# Patient Record
Sex: MALE | Race: WHITE | NOT HISPANIC OR LATINO | Employment: FULL TIME | URBAN - METROPOLITAN AREA
[De-identification: names, ages, dates, MRNs, and addresses within clinical notes are randomized per-mention and may not be internally consistent; named-entity substitution may affect disease eponyms.]

---

## 2024-03-20 ENCOUNTER — APPOINTMENT (OUTPATIENT)
Dept: CARDIOLOGY | Facility: HOSPITAL | Age: 34
End: 2024-03-20
Payer: COMMERCIAL

## 2024-03-20 ENCOUNTER — HOSPITAL ENCOUNTER (EMERGENCY)
Facility: HOSPITAL | Age: 34
Discharge: HOME | End: 2024-03-20
Attending: EMERGENCY MEDICINE
Payer: COMMERCIAL

## 2024-03-20 ENCOUNTER — APPOINTMENT (OUTPATIENT)
Dept: RADIOLOGY | Facility: HOSPITAL | Age: 34
End: 2024-03-20
Payer: COMMERCIAL

## 2024-03-20 VITALS
RESPIRATION RATE: 18 BRPM | HEART RATE: 77 BPM | HEIGHT: 72 IN | DIASTOLIC BLOOD PRESSURE: 89 MMHG | WEIGHT: 205 LBS | SYSTOLIC BLOOD PRESSURE: 113 MMHG | BODY MASS INDEX: 27.77 KG/M2 | OXYGEN SATURATION: 98 % | TEMPERATURE: 98.1 F

## 2024-03-20 DIAGNOSIS — R07.9 CHEST PAIN, UNSPECIFIED TYPE: Primary | ICD-10-CM

## 2024-03-20 LAB
ALBUMIN SERPL BCP-MCNC: 4.3 G/DL (ref 3.4–5)
ALP SERPL-CCNC: 49 U/L (ref 33–120)
ALT SERPL W P-5'-P-CCNC: 16 U/L (ref 10–52)
ANION GAP SERPL CALC-SCNC: 12 MMOL/L (ref 10–20)
AST SERPL W P-5'-P-CCNC: 11 U/L (ref 9–39)
ATRIAL RATE: 99 BPM
BASOPHILS # BLD AUTO: 0.04 X10*3/UL (ref 0–0.1)
BASOPHILS NFR BLD AUTO: 0.4 %
BILIRUB SERPL-MCNC: 0.5 MG/DL (ref 0–1.2)
BUN SERPL-MCNC: 14 MG/DL (ref 6–23)
CALCIUM SERPL-MCNC: 8.5 MG/DL (ref 8.6–10.3)
CARDIAC TROPONIN I PNL SERPL HS: <3 NG/L (ref 0–20)
CARDIAC TROPONIN I PNL SERPL HS: <3 NG/L (ref 0–20)
CHLORIDE SERPL-SCNC: 108 MMOL/L (ref 98–107)
CO2 SERPL-SCNC: 23 MMOL/L (ref 21–32)
CREAT SERPL-MCNC: 1.06 MG/DL (ref 0.5–1.3)
D DIMER PPP FEU-MCNC: <215 NG/ML FEU
EGFRCR SERPLBLD CKD-EPI 2021: >90 ML/MIN/1.73M*2
EOSINOPHIL # BLD AUTO: 0.28 X10*3/UL (ref 0–0.7)
EOSINOPHIL NFR BLD AUTO: 2.9 %
ERYTHROCYTE [DISTWIDTH] IN BLOOD BY AUTOMATED COUNT: 12.3 % (ref 11.5–14.5)
FLUAV RNA RESP QL NAA+PROBE: NOT DETECTED
FLUBV RNA RESP QL NAA+PROBE: NOT DETECTED
GLUCOSE SERPL-MCNC: 108 MG/DL (ref 74–99)
HCT VFR BLD AUTO: 42.7 % (ref 41–52)
HGB BLD-MCNC: 14.6 G/DL (ref 13.5–17.5)
IMM GRANULOCYTES # BLD AUTO: 0.03 X10*3/UL (ref 0–0.7)
IMM GRANULOCYTES NFR BLD AUTO: 0.3 % (ref 0–0.9)
INR PPP: 1.1 (ref 0.9–1.1)
LYMPHOCYTES # BLD AUTO: 3.16 X10*3/UL (ref 1.2–4.8)
LYMPHOCYTES NFR BLD AUTO: 33.1 %
MAGNESIUM SERPL-MCNC: 1.9 MG/DL (ref 1.6–2.4)
MCH RBC QN AUTO: 30.7 PG (ref 26–34)
MCHC RBC AUTO-ENTMCNC: 34.2 G/DL (ref 32–36)
MCV RBC AUTO: 90 FL (ref 80–100)
MONOCYTES # BLD AUTO: 0.72 X10*3/UL (ref 0.1–1)
MONOCYTES NFR BLD AUTO: 7.5 %
NEUTROPHILS # BLD AUTO: 5.33 X10*3/UL (ref 1.2–7.7)
NEUTROPHILS NFR BLD AUTO: 55.8 %
NRBC BLD-RTO: 0 /100 WBCS (ref 0–0)
P AXIS: 56 DEGREES
P OFFSET: 205 MS
P ONSET: 146 MS
PLATELET # BLD AUTO: 258 X10*3/UL (ref 150–450)
POTASSIUM SERPL-SCNC: 3.6 MMOL/L (ref 3.5–5.3)
PR INTERVAL: 148 MS
PROT SERPL-MCNC: 6.4 G/DL (ref 6.4–8.2)
PROTHROMBIN TIME: 11.9 SECONDS (ref 9.8–12.8)
Q ONSET: 220 MS
QRS COUNT: 16 BEATS
QRS DURATION: 90 MS
QT INTERVAL: 336 MS
QTC CALCULATION(BAZETT): 431 MS
QTC FREDERICIA: 397 MS
R AXIS: 28 DEGREES
RBC # BLD AUTO: 4.76 X10*6/UL (ref 4.5–5.9)
SARS-COV-2 RNA RESP QL NAA+PROBE: NOT DETECTED
SODIUM SERPL-SCNC: 139 MMOL/L (ref 136–145)
T AXIS: 40 DEGREES
T OFFSET: 388 MS
TSH SERPL-ACNC: 1.52 MIU/L (ref 0.44–3.98)
VENTRICULAR RATE: 99 BPM
WBC # BLD AUTO: 9.6 X10*3/UL (ref 4.4–11.3)

## 2024-03-20 PROCEDURE — 83735 ASSAY OF MAGNESIUM: CPT | Performed by: EMERGENCY MEDICINE

## 2024-03-20 PROCEDURE — 71045 X-RAY EXAM CHEST 1 VIEW: CPT

## 2024-03-20 PROCEDURE — 36415 COLL VENOUS BLD VENIPUNCTURE: CPT | Performed by: EMERGENCY MEDICINE

## 2024-03-20 PROCEDURE — 93005 ELECTROCARDIOGRAM TRACING: CPT

## 2024-03-20 PROCEDURE — 84443 ASSAY THYROID STIM HORMONE: CPT | Performed by: EMERGENCY MEDICINE

## 2024-03-20 PROCEDURE — 71045 X-RAY EXAM CHEST 1 VIEW: CPT | Performed by: RADIOLOGY

## 2024-03-20 PROCEDURE — 99283 EMERGENCY DEPT VISIT LOW MDM: CPT | Mod: 25

## 2024-03-20 PROCEDURE — 80053 COMPREHEN METABOLIC PANEL: CPT | Performed by: EMERGENCY MEDICINE

## 2024-03-20 PROCEDURE — 96360 HYDRATION IV INFUSION INIT: CPT

## 2024-03-20 PROCEDURE — 85610 PROTHROMBIN TIME: CPT | Performed by: EMERGENCY MEDICINE

## 2024-03-20 PROCEDURE — 84484 ASSAY OF TROPONIN QUANT: CPT | Performed by: EMERGENCY MEDICINE

## 2024-03-20 PROCEDURE — 85379 FIBRIN DEGRADATION QUANT: CPT | Performed by: EMERGENCY MEDICINE

## 2024-03-20 PROCEDURE — 85025 COMPLETE CBC W/AUTO DIFF WBC: CPT | Performed by: EMERGENCY MEDICINE

## 2024-03-20 PROCEDURE — 2500000004 HC RX 250 GENERAL PHARMACY W/ HCPCS (ALT 636 FOR OP/ED): Performed by: EMERGENCY MEDICINE

## 2024-03-20 PROCEDURE — 87636 SARSCOV2 & INF A&B AMP PRB: CPT | Performed by: EMERGENCY MEDICINE

## 2024-03-20 RX ADMIN — SODIUM CHLORIDE, POTASSIUM CHLORIDE, SODIUM LACTATE AND CALCIUM CHLORIDE 1000 ML: 600; 310; 30; 20 INJECTION, SOLUTION INTRAVENOUS at 15:56

## 2024-03-20 ASSESSMENT — PAIN DESCRIPTION - LOCATION: LOCATION: CHEST

## 2024-03-20 ASSESSMENT — PAIN SCALES - GENERAL: PAINLEVEL_OUTOF10: 3

## 2024-03-20 ASSESSMENT — COLUMBIA-SUICIDE SEVERITY RATING SCALE - C-SSRS
6. HAVE YOU EVER DONE ANYTHING, STARTED TO DO ANYTHING, OR PREPARED TO DO ANYTHING TO END YOUR LIFE?: NO
2. HAVE YOU ACTUALLY HAD ANY THOUGHTS OF KILLING YOURSELF?: NO
1. IN THE PAST MONTH, HAVE YOU WISHED YOU WERE DEAD OR WISHED YOU COULD GO TO SLEEP AND NOT WAKE UP?: NO

## 2024-03-20 ASSESSMENT — PAIN DESCRIPTION - DESCRIPTORS
DESCRIPTORS: PRESSURE
DESCRIPTORS: HEAVINESS;PRESSURE

## 2024-03-20 ASSESSMENT — PAIN DESCRIPTION - PAIN TYPE: TYPE: ACUTE PAIN

## 2024-03-20 ASSESSMENT — PAIN - FUNCTIONAL ASSESSMENT: PAIN_FUNCTIONAL_ASSESSMENT: 0-10

## 2024-03-20 NOTE — DISCHARGE INSTRUCTIONS
You were seen emergency room today for evaluation of chest pain.  Your labs and imaging are overall reassuring.  Please follow-up your primary care provider regarding this emergency room visit.  Please return if you have worsening symptoms include not up to recurrent chest pain, severe pain radiating to the back, shortness of breath, weakness, numbness, fever, or if you have any other concerns.

## 2024-03-20 NOTE — ED TRIAGE NOTES
Giancarlo Basilio is a 33 y.o. male BIBA with complaint of chest pain starting at work earlier today moving items. He reports sitting down and the pain not improving and also feeling diaphoretic and light headed so he came to the ED for evaluation.

## 2024-03-20 NOTE — ED PROVIDER NOTES
History/Exam limitations: none.   Additional history was obtained from patient.          HPI:    Giancarlo Basilio is a 33 y.o. male no significant past medical history present with chest pain x 1 day.  Patient states that he was moving around some rocks at work and began having left-sided chest pain.  Initially seemed like it was exertional.  Was resting his test for approximate hour and was still having episodes.  Had some pleuritic nature to it.  No positional component.  No recent illness.  No radiation of pain.  No nausea, vomiting, abdominal pain.  No unilateral leg swelling, mops this, prolonged immobilization.  No headache or vision changes, fever, chills.          Physical Exam:  ED Triage Vitals [03/20/24 1510]   Temperature Heart Rate Respirations BP   36.7 °C (98 °F) (!) 101 16 (!) 132/92      Pulse Ox Temp Source Heart Rate Source Patient Position   100 % Oral Monitor Sitting      BP Location FiO2 (%)     Left arm --        GEN:      Alert, NAD  Eyes:       PERRL, EOMI  HENT:      NC/AT, OP clear, airway patent, MM  CV:      Tachycardic rate, RR, no MRG, no LE pitting edema, 2+ radial and pedal pulses  PULM:     CTAB, no w/r/r, easy WOB, symmetric chest rise  ABD:      Soft, NT, ND, no masses, BS +  NEURO:   A&Ox3, no focal deficits    MSK:      FROM, no joint deformities or swelling, no e/o trauma  SKIN:       Warm and dry  PSYCH:    Appropriate mood and affect         MDM/ED Course:   Giancarlo Basilio is a 33 y.o. male no significant past medical history present with chest pain x 1 day.  Vitals markable for mild tachycardia and hypertension as above.  Exam as documented above.  Differential includes ACS, PNA, PE, aortic dissection/pathology, pneumothorax, pericardial effusion, pericarditis, myocarditis, GERD, musculoskeletal pain (was moving equipment prior to onset), pleurisy.  Unlikely myocarditis given overall clinical appearance, vitals, history, exam.  No positional component, no recent illness,  no friction rub, EKG as below, unlikely pericarditis.  No tearing type sensation to the back, patient has minimal risk factors, equal pulses, no significant hypertension, less likely aortic dissection.  Labs reviewed and CBC with no significant anemia or leukocytosis.  Chemistry reassuring.  D-dimer obtained and negative/undetectably low, low risk Wells, unlikely pulmonary embolism.  COVID flu negative.  TSH normal.  Electrolytes including magnesium reassuring.  Troponin undetectably low x 2, unlikely ACS.  Patient felt well in the ED.  Patient deferred pain medications.  Patient was given 1 L IV fluids.  Initial mild tachycardia to 101 improved to 70s on reassessment.  Blood pressure reassuring at 110s over 80s.  Chest x-ray also reviewed and reassuring per radiology read with no acute findings.  Patient reassured by workup and feels comfortable discharge.  Low risk heart score.  Patient was explained findings, exam/study results, the importance of follow up and the plan moving forward; patient verbalized understanding and agreement. All questions were answered and no new questions at this time. Patient will be discharged with strict return precautions, follow up plan with PCP.    EKG reviewed by me: 3:19 PM normal sinus rhythm, rate 99, normal axis, normal intervals, sinus arrhythmia, no significant ST elevations, no STEMI, no prior for comparison.    EKG reviewed by me: 4:12 PM sinus tachycardia, rate 115, normal axis, normal intervals, no STEMI, overall similar to prior EKG.     Diagnoses as of 03/22/24 1418   Chest pain, unspecified type         Chronic medical conditions affecting care listed in MDM. I independently reviewed imaging studies and agreed with radiology reads. I reviewed recent and relevant outside records including PCP notes, Prior discharge summaries, and prior radiology reports.    Procedure  Procedures    Diagnosis:   1.  Chest pain    Dispo: Discharged in stable condition      Disclaimer:  Portions of this note were dictated by speech recognition. An attempt at proof reading was made to minimize errors. Minor errors in transcription may be present.  Please call if questions.     Jean Carlos Tovar MD  03/22/24 2196

## 2024-03-21 LAB
ATRIAL RATE: 115 BPM
P AXIS: 66 DEGREES
P OFFSET: 205 MS
P ONSET: 136 MS
PR INTERVAL: 164 MS
Q ONSET: 218 MS
QRS COUNT: 19 BEATS
QRS DURATION: 84 MS
QT INTERVAL: 334 MS
QTC CALCULATION(BAZETT): 462 MS
QTC FREDERICIA: 414 MS
R AXIS: 48 DEGREES
T AXIS: 43 DEGREES
T OFFSET: 385 MS
VENTRICULAR RATE: 115 BPM

## 2024-03-22 ASSESSMENT — HEART SCORE
ECG: NORMAL
HISTORY: SLIGHTLY SUSPICIOUS
HEART SCORE: 0
AGE: <45
TROPONIN: LESS THAN OR EQUAL TO NORMAL LIMIT
RISK FACTORS: NO KNOWN RISK FACTORS